# Patient Record
Sex: MALE | Employment: FULL TIME | URBAN - METROPOLITAN AREA
[De-identification: names, ages, dates, MRNs, and addresses within clinical notes are randomized per-mention and may not be internally consistent; named-entity substitution may affect disease eponyms.]

---

## 2022-08-04 LAB — PROSTATE SPECIFIC ANTIGEN: 0.45 NG/ML

## 2023-02-13 ENCOUNTER — OFFICE VISIT (OUTPATIENT)
Dept: UROLOGY | Age: 68
End: 2023-02-13
Payer: COMMERCIAL

## 2023-02-13 DIAGNOSIS — N48.6 PEYRONIE DISEASE: ICD-10-CM

## 2023-02-13 DIAGNOSIS — N13.8 BPH WITH OBSTRUCTION/LOWER URINARY TRACT SYMPTOMS: ICD-10-CM

## 2023-02-13 DIAGNOSIS — N52.9 ERECTILE DYSFUNCTION, UNSPECIFIED ERECTILE DYSFUNCTION TYPE: Primary | ICD-10-CM

## 2023-02-13 DIAGNOSIS — N40.1 BPH WITH OBSTRUCTION/LOWER URINARY TRACT SYMPTOMS: ICD-10-CM

## 2023-02-13 LAB
BILIRUBIN, URINE, POC: NEGATIVE
BLOOD URINE, POC: NEGATIVE
GLUCOSE URINE, POC: NEGATIVE
KETONES, URINE, POC: NEGATIVE
LEUKOCYTE ESTERASE, URINE, POC: NEGATIVE
NITRITE, URINE, POC: NEGATIVE
PH, URINE, POC: 7 (ref 4.6–8)
PROTEIN,URINE, POC: NEGATIVE
SPECIFIC GRAVITY, URINE, POC: 1.01 (ref 1–1.03)
URINALYSIS CLARITY, POC: NORMAL
URINALYSIS COLOR, POC: NORMAL
UROBILINOGEN, POC: 0.2

## 2023-02-13 PROCEDURE — 81003 URINALYSIS AUTO W/O SCOPE: CPT | Performed by: UROLOGY

## 2023-02-13 PROCEDURE — 1123F ACP DISCUSS/DSCN MKR DOCD: CPT | Performed by: UROLOGY

## 2023-02-13 PROCEDURE — 99204 OFFICE O/P NEW MOD 45 MIN: CPT | Performed by: UROLOGY

## 2023-02-13 RX ORDER — TADALAFIL 20 MG/1
20 TABLET ORAL PRN
Qty: 12 TABLET | Refills: 5 | Status: SHIPPED | OUTPATIENT
Start: 2023-02-13

## 2023-02-13 RX ORDER — TAMSULOSIN HYDROCHLORIDE 0.4 MG/1
CAPSULE ORAL
COMMUNITY
Start: 2022-12-12

## 2023-02-13 RX ORDER — LEVOTHYROXINE SODIUM 0.05 MG/1
TABLET ORAL
COMMUNITY
Start: 2022-12-12

## 2023-02-13 ASSESSMENT — ENCOUNTER SYMPTOMS
BACK PAIN: 1
RESPIRATORY NEGATIVE: 1
EYES NEGATIVE: 1

## 2023-02-13 NOTE — PROGRESS NOTES
[facility]  [address] [facility]    [address]  [facility], [address] [facility]  [phone]          Kaveh Jaramillo  : 1955    Chief Complaint   Patient presents with    Erectile Dysfunction          HPI     Kaveh Jaramillo is a 74 y.o. male ([employer]) referred in regards to ED/Peyronie's disease and mild BPH/LUTS. He lives in [address] a portion of the year and [facility] a portion as well. He first noticed issues with ED and dorsal penile curvature 2 years prior to first visit with us in . Curve is less than a banana and painless. It is stable. He can achieve an soft erection that will barely penetrate. He has tried 50 mg sildenafil twice without great results. He gets up twice at night to void and has some frequency during the day. Patient can think of no other instigating or exacerbating events. PSA: 22 0.45          Past Medical History:   Diagnosis Date    Thyroid disease      History reviewed. No pertinent surgical history. Current Outpatient Medications   Medication Sig Dispense Refill    levothyroxine (SYNTHROID) 50 MCG tablet       tamsulosin (FLOMAX) 0.4 MG capsule       tadalafil (CIALIS) 20 MG tablet Take 1 tablet by mouth as needed for Erectile Dysfunction 12 tablet 5     No current facility-administered medications for this visit.      No Known Allergies  Social History     Socioeconomic History    Marital status:      Spouse name: Not on file    Number of children: Not on file    Years of education: Not on file    Highest education level: Not on file   Occupational History    Not on file   Tobacco Use    Smoking status: Never    Smokeless tobacco: Never   Vaping Use    Vaping Use: Never used   Substance and Sexual Activity    Alcohol use: Yes     Comment: 1-2 drinks/week    Drug use: Not Currently    Sexual activity: Yes     Partners: Female   Other Topics Concern    Not on file   Social History Narrative    Not on file     Social Determinants of Health     Financial Resource Strain: Not on file   Food Insecurity: Not on file   Transportation Needs: Not on file   Physical Activity: Not on file   Stress: Not on file   Social Connections: Not on file   Intimate Partner Violence: Not on file   Housing Stability: Not on file     Family History   Problem Relation Age of Onset    Cancer Father         Multiple myeloma    Diabetes Mother        Review of Systems  Constitutional: Negative  Skin: Negative skin ROS  Eyes: Eyes negative  ENT: HENT negative  Respiratory: Respiratory negative  Cardiovascular: Positive for leg swelling and varicose veins. GI: Neg GI ROS  Genitourinary: Positive for hematuria, nocturia, urgency, frequent urination and erectile dysfunction. Musculoskeletal: Positive for back pain and neck pain. Neurological: Neg neuro ROS  Psychological: Neg psych ROS  Endocrine: Endocrine negative  Hem/Lymphatic: Hematologic/lymphatic negative    Urinalysis  UA - Dipstick  Results for orders placed or performed in visit on 02/13/23   AMB POC URINALYSIS DIP STICK AUTO W/O MICRO   Result Value Ref Range    Color, Urine, POC      Clarity, Urine, POC      Glucose, Urine, POC Negative Negative    Bilirubin, Urine, POC Negative Negative    Ketones, Urine, POC Negative Negative    Specific Gravity, Urine, POC 1.010 1.001 - 1.035    Blood, Urine, POC Negative Negative    pH, Urine, POC 7.0 4.6 - 8.0    Protein, Urine, POC Negative Negative    Urobilinogen, POC 0.2     Nitrate, Urine, POC Negative Negative    Leukocyte Esterase, Urine, POC Negative Negative       There were no vitals taken for this visit. GENERAL: NAD, ALERT, A&O x 3, GAIT NORMAL  LUNGS: easy work of breathing  ABDOMEN: soft, non tender  NEUROLOGIC: cranial nerves 2-12 grossly intact   : no phallic lesions. Subtle dorsal penile plaque. Assessment and Plan    ICD-10-CM    1.  Erectile dysfunction, unspecified erectile dysfunction type  N52.9 AMB POC URINALYSIS DIP STICK AUTO W/O MICRO     tadalafil (CIALIS) 20 MG tablet      2. Peyronie disease  N48.6       3. BPH with obstruction/lower urinary tract symptoms  N40.1     N13.8           We discussed the potential comorbidities that can lead to erectile dysfunction. We discussed treatment options including PO PDE 5 inhibitors, trimix and PGE1 intracorporeal injection, Muse, penile pump, and inflatable penile prosthesis. Pt. has decided to move forward with tadalafil 20 mg's prn. Potential drug interactions were discussed. Potential side effects were discussed. He knows it will be more effective on an empty stomach and when taken 1 hour prior to foreplay. I will see him back in 6 mo and we could move forward with trimix if needed. I discussed the patho-physiology of Peyronie's with pt. today. We then discussed treatment options including doing nothing, PO therapy, plaque injection, plication, and grafting procedures. Chances of success and risks of each were discussed. Pt. has decided to hold on treatment. I agree. I discussed BPH at length with patient today. We discussed it's natural progression with aging. We discussed treatment options including doing nothing, adding a medication (alpha blocker vs. 5 alpha reductase inhibitor), and surgical options (TURP vs. plasmabutton PVP vs. Urolift vs. Rezum). Advantages and potential risks/side effects of each were discussed. He is not ready for any therapy as of yet.

## 2023-07-25 ENCOUNTER — TELEPHONE (OUTPATIENT)
Dept: UROLOGY | Age: 68
End: 2023-07-25